# Patient Record
Sex: MALE | Race: WHITE
[De-identification: names, ages, dates, MRNs, and addresses within clinical notes are randomized per-mention and may not be internally consistent; named-entity substitution may affect disease eponyms.]

---

## 2021-02-11 ENCOUNTER — HOSPITAL ENCOUNTER (EMERGENCY)
Dept: HOSPITAL 47 - EC | Age: 31
LOS: 1 days | Discharge: HOME | End: 2021-02-12
Payer: COMMERCIAL

## 2021-02-11 VITALS — TEMPERATURE: 98.2 F | RESPIRATION RATE: 18 BRPM

## 2021-02-11 DIAGNOSIS — F17.200: ICD-10-CM
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
## 2021-02-11 DIAGNOSIS — S05.02XA: Primary | ICD-10-CM

## 2021-02-11 DIAGNOSIS — Z88.0: ICD-10-CM
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
## 2021-02-11 PROCEDURE — 99283 EMERGENCY DEPT VISIT LOW MDM: CPT

## 2021-02-12 VITALS — DIASTOLIC BLOOD PRESSURE: 88 MMHG | HEART RATE: 97 BPM | SYSTOLIC BLOOD PRESSURE: 151 MMHG

## 2021-02-12 NOTE — ED
Eye Problem HPI





- General


Chief complaint: Eye Problems


Stated complaint: Left eye injury


Time Seen by Provider: 02/11/21 23:52


Source: patient, RN notes reviewed, old records reviewed


Mode of arrival: ambulatory


Limitations: no limitations





- History of Present Illness


Initial comments: 





This is a 30-year-old male DF for evaluation patient does have pain.  Left eye 

pain.  Patient had sustained an injury by some sort of systemic in the left eye.

 Patient having pain and tearing of that left jaw left eye but no real vision 

changes maybe some blurry vision from tearing.  Symptoms worse with light


MD chief complaint: eye pain, eye redness, eye injury, foreign body


-: hour(s)


Onset Description: sudden


Location: left eye


Place: home


If Injury: none


Eye Symptoms: burning, redness, pain, blurry vision


Severity: moderate


Severity scale (1-10): 4


If Pain, Quality: sharp


Consistency: constant


Associated Symptoms: none


Treatments Prior to Arrival: none





- Related Data


                                    Allergies











Allergy/AdvReac Type Severity Reaction Status Date / Time


 


Penicillins Allergy  Unknown Verified 02/11/21 23:44














Review of Systems


ROS Statement: 


Those systems with pertinent positive or pertinent negative responses have been 

documented in the HPI.





ROS Other: All systems not noted in ROS Statement are negative.





Past Medical History


Past Medical History: No Reported History


History of Any Multi-Drug Resistant Organisms: None Reported


Past Surgical History: Orthopedic Surgery


Past Psychological History: No Psychological Hx Reported


Smoking Status: Current every day smoker


Past Alcohol Use History: None Reported


Past Drug Use History: None Reported





General Exam


Limitations: no limitations


General appearance: alert, in no apparent distress


Head exam: Present: atraumatic, normocephalic, normal inspection


Eye exam: Present: normal appearance, PERRL, EOMI.  Absent: scleral icterus, 

conjunctival injection, periorbital swelling


Pupils: Present: other (L eye 10 oclock corneal abrasion)


ENT exam: Present: normal exam, mucous membranes moist


Neck exam: Present: normal inspection.  Absent: tenderness, meningismus, 

lymphadenopathy


Respiratory exam: Present: normal lung sounds bilaterally.  Absent: respiratory 

distress, wheezes, rales, rhonchi, stridor


Cardiovascular Exam: Present: regular rate, normal rhythm, normal heart sounds. 

Absent: systolic murmur, diastolic murmur, rubs, gallop, clicks


GI/Abdominal exam: Present: soft, normal bowel sounds.  Absent: distended, tende

rness, guarding, rebound, rigid


Extremities exam: Present: normal inspection, full ROM, normal capillary refill.

 Absent: tenderness, pedal edema, joint swelling, calf tenderness


Back exam: Present: normal inspection


Neurological exam: Present: alert, oriented X3, CN II-XII intact


Psychiatric exam: Present: normal affect, normal mood


Skin exam: Present: warm, dry, intact, normal color.  Absent: rash





Course


                                   Vital Signs











  02/11/21





  23:42


 


Temperature 98.2 F


 


Pulse Rate 90


 


Respiratory 18





Rate 


 


Blood Pressure 135/87


 


O2 Sat by Pulse 100





Oximetry 














Medical Decision Making





- Medical Decision Making





30 male who did sustain a left eye corneal abrasion earlier in the night.  

Patient has pain control currently, pleurisy test again didn't reveal abrasion 

at 10:00.  Patient given eyedrops, antibiotic eyedrops and can be discharged 

home





Disposition


Clinical Impression: 


 Left corneal abrasion





Disposition: HOME SELF-CARE


Condition: Good


Instructions (If sedation given, give patient instructions):  Corneal Abrasion 

(ED)


Is patient prescribed a controlled substance at d/c from ED?: No


Referrals: 


None,Stated [Primary Care Provider] - 1-2 days